# Patient Record
(demographics unavailable — no encounter records)

---

## 2019-08-14 RX ORDER — INDOMETHACIN 25 MG/1
CAPSULE ORAL
Start: 2019-08-14

## 2019-08-14 NOTE — TELEPHONE ENCOUNTER
Denied  Patient never seen at [facility] - no future appt either  Belkis PERES, RN

## 2019-08-14 NOTE — TELEPHONE ENCOUNTER
"Requested Prescriptions   Pending Prescriptions Disp Refills     indomethacin (INDOCIN) 25 MG capsule [Pharmacy Med Name: INDOMETHACIN 25MG CAPSULE] 180 capsule      Sig: TAKE 2 CAPSULES BY MOUTH DAILY AS NEEDED FOR GOUT PAIN.       Gout Agents Protocol Failed - 8/13/2019  4:27 PM        Failed - CBC on file in past 12 months     No lab results found.              Failed - ALT on file in past 12 months     No lab results found.          Failed - Has Uric Acid on file in past 12 months and value is less than 6     No lab results found.  If level is 6mg/dL or greater, ok to refill one time and refer to provider.           Failed - Recent (12 mo) or future (30 days) visit within the authorizing provider's specialty     Patient had office visit in the last 12 months or has a visit in the next 30 days with authorizing provider or within the authorizing provider's specialty.  See \"Patient Info\" tab in inbasket, or \"Choose Columns\" in Meds & Orders section of the refill encounter.              Failed - Normal serum creatinine on file in the past 12 months     No lab results found.          Passed - Medication is active on med list        Passed - Patient is age 18 or older        Last Written Prescription Date:  Patient reported  Last Fill Quantity: unk,  # refills:    Last office visit: No previous visit found with prescribing provider:     Future Office Visit:      Melba Cheung MA      "

## 2020-07-15 RX ORDER — INDOMETHACIN 25 MG/1
25 CAPSULE ORAL DAILY
Start: 2020-07-15

## 2020-07-15 NOTE — TELEPHONE ENCOUNTER
Denied RX- pt has never been seen in this clinic     Note to pharmacy to send to Pt's PCP     Leslie TOWNSEND RN